# Patient Record
(demographics unavailable — no encounter records)

---

## 2024-10-10 NOTE — PHYSICAL EXAM
[Midline] : trachea located in midline position [Normal] : tympanic membranes are normal in both ears [de-identified] : crust in right nare otherwise no prominent vessels [de-identified] : 1/1

## 2024-10-10 NOTE — PHYSICAL EXAM
[Midline] : trachea located in midline position [Normal] : tympanic membranes are normal in both ears [de-identified] : crust in right nare otherwise no prominent vessels [de-identified] : 1/1